# Patient Record
(demographics unavailable — no encounter records)

---

## 2025-07-21 NOTE — PROCEDURE
[Abnormal Pap] : an abnormal pap smear [Patient] : the patient [Verbal Consent] : verbal consent was obtained prior to the procedure and is detailed in the patient's record [Site Verification] : site verification performed. [No Abnormalities] : no abnormalities seen [Direct Pressure] : direct pressure [No Complications] : none [Tolerated Well] : the patient tolerated the procedure well [Post procedure instructions and information given] : post procedure instructions and information given and reviewed with patient. [Colposcopy] : colposcopy [ASCUS] : ASCUS [HPV high risk] : PCR positive for high risk HPV [Written Consent] : written consent was obtained prior to the procedure and is detailed in the patient's record [Cervical Pap Performed] : a cervical pap smear was performed [SCJ Fully Visualized] : the squamocolumnar junction was not fully visualized [Lesion] : lesion(s) seen [Posterior] : on the posterior vaginal wall [Biopsy] : a biopsy was taken of the lesion [Chanelle's] : Chanelle's solution [de-identified] : Unable to obtain ECC due to cervical stenosis; entire vagine visualized - no colpo lesions

## 2025-07-21 NOTE — DISCUSSION/SUMMARY
[Reviewed Clinical Lab Test(s)] : Results of clinical tests were reviewed. [FreeTextEntry1] : -We disc the prior findings and today's eval. We disc the colpo and await the Pap and bx from today for further dispo.  -We again disc the limits of the eval due to os stenosis.  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   -Her instructions were reviewed. -All Q/A. -She will f/u in ~2wk for a TH to disc results and further recs.  -We disc the import of [facility] (eg, [facility] and bone health).   -Effort for the visit includes the note prep, review of prior material, interview, exam, further documentation, and coordination of care.

## 2025-07-21 NOTE — PROCEDURE
[Abnormal Pap] : an abnormal pap smear [Patient] : the patient [Verbal Consent] : verbal consent was obtained prior to the procedure and is detailed in the patient's record [Site Verification] : site verification performed. [No Abnormalities] : no abnormalities seen [Direct Pressure] : direct pressure [No Complications] : none [Tolerated Well] : the patient tolerated the procedure well [Post procedure instructions and information given] : post procedure instructions and information given and reviewed with patient. [Colposcopy] : colposcopy [ASCUS] : ASCUS [HPV high risk] : PCR positive for high risk HPV [Written Consent] : written consent was obtained prior to the procedure and is detailed in the patient's record [Cervical Pap Performed] : a cervical pap smear was performed [SCJ Fully Visualized] : the squamocolumnar junction was not fully visualized [Lesion] : lesion(s) seen [Posterior] : on the posterior vaginal wall [Biopsy] : a biopsy was taken of the lesion [Chanelle's] : Chanelle's solution [de-identified] : Unable to obtain ECC due to cervical stenosis; entire vagine visualized - no colpo lesions

## 2025-07-21 NOTE — HISTORY OF PRESENT ILLNESS
[FreeTextEntry1] : Referring MD/PCP- Dr. Christal Styles GYN- Dr. Dalton (previously Dr. Pineda) GI- unknown Gyn Onc: Dr MARY ANNE Dalton, [facility]  Ms. BRITTNEE CALDERA presents for surveillance. She is s/p laser ablation of the vaginal on 9/6/22.  Sept 2024: Pap ASCUS; Pos HR-HPV. 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 See [facility] chart note. She recalls getting results but does not recall the rec for RTO in 6m.   No VB, VD, or pain. No reported GI or  concerns.   She reports [facility] is UTD.

## 2025-07-21 NOTE — REVIEW OF SYSTEMS
[Vision Problems] : vision problems [Joint Pain] : joint pain [Joint Stiffness] : joint stiffness [Muscle Pain] : muscle pain [Back Pain] : back pain [Insomnia] : insomnia [Anxiety] : anxiety [Negative] : Heme/Lymph

## 2025-07-21 NOTE — ASSESSMENT
[FreeTextEntry1] : See health maintenance assessment and plan outlined below. In addition: Full labs and urine testing done today Bone density done 2024 F/U with Dr. Cortés 2025 Podiatry f/u as needed 2025 Ortho/pain management/neurology f/u 2025 Colonoscopy done 11/16 = due 2021 as per GI note = see GI 2025 for f/u procedure as overdue = referrals given Had mammograms 2024 = RX given to do 2025 To see GYN/Oncology 2025 Urology f/u as needed 2025 Continue meds, diet, exercise as outlined EKG done and report d/w patient and scanned CXR done 2022 = she will consider repeat imaging 2025 Vaccines d/w patient ENT follow-up 2025 as needed To f/u with derm 2025 for complete skin exam To see ophtho 2025 for complete eye exam Dental f/u 2025 Psych f/u 2025 as needed = seeing therapist Can consider sleep medicine evaluation 2025 for insomnia///zolpidem prescription renewed at her request RTC for annual physical and as needed To call for any medical issues or if her status worsens/changes and to return to be seen immediately All of the above was discussed in detail with her and all of her questions were answered She verbally confirmed understanding of all of the above and agreement with the above plan  [Vaccines Reviewed] : Immunizations reviewed today. Please see immunization details in the vaccine log within the immunization flowsheet.

## 2025-07-21 NOTE — DISCUSSION/SUMMARY
[Reviewed Clinical Lab Test(s)] : Results of clinical tests were reviewed. [FreeTextEntry1] : -We disc the prior findings and today's eval. We disc the colpo and await the Pap and bx from today for further dispo.  -We again disc the limits of the eval due to os stenosis.  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   -Her instructions were reviewed. -All Q/A. -She will f/u in ~2wk for a TH to disc results and further recs.  -We disc the import of [facility] (eg, [facility] and bone health).   -Effort for the visit includes the note prep, review of prior material, interview, exam, further documentation, and coordination of care.

## 2025-07-21 NOTE — HEALTH RISK ASSESSMENT
[4 or more  times a week (4 pts)] : 4 or more  times a week (4 points) [1 or 2 (0 pts)] : 1 or 2 (0 points) [Never (0 pts)] : Never (0 points) [No] : In the past 12 months have you used drugs other than those required for medical reasons? No [No falls in past year] : Patient reported no falls in the past year [0] : 2) Feeling down, depressed, or hopeless: Not at all (0) [PHQ-2 Negative - No further assessment needed] : PHQ-2 Negative - No further assessment needed [PHQ-9 Negative - No further assessment needed] : PHQ-9 Negative - No further assessment needed [Former] : Former [NO] : No [Patient declined mammogram] : Patient declined mammogram [Patient declined colonoscopy] : Patient declined colonoscopy [HIV test declined] : HIV test declined [Hepatitis C test declined] : Hepatitis C test declined [Alone] : lives alone [# of Members in Household ___] :  household currently consist of [unfilled] member(s) [Retired] : retired [College] : College [] :  [# Of Children ___] : has [unfilled] children [Feels Safe at Home] : Feels safe at home [Fully functional (bathing, dressing, toileting, transferring, walking, feeding)] : Fully functional (bathing, dressing, toileting, transferring, walking, feeding) [Fully functional (using the telephone, shopping, preparing meals, housekeeping, doing laundry, using] : Fully functional and needs no help or supervision to perform IADLs (using the telephone, shopping, preparing meals, housekeeping, doing laundry, using transportation, managing medications and managing finances) [Smoke Detector] : smoke detector [Carbon Monoxide Detector] : carbon monoxide detector [Seat Belt] :  uses seat belt [Sunscreen] : uses sunscreen [Reviewed no changes] : Reviewed, no changes [Excellent] : ~his/her~  mood as  excellent [Little interest or pleasure doing things] : 1) Little interest or pleasure doing things [Feeling down, depressed, or hopeless] : 2) Feeling down, depressed, or hopeless [Yes] : Reviewed medication list for presence of high-risk medications. [Opioids] : opioids [Designated Healthcare Proxy] : Designated healthcare proxy [Name: ___] : Health Care Proxy's Name: [unfilled]  [Relationship: ___] : Relationship: [unfilled] [de-identified] : GYN oncology, dentist, ophthalmology, dermatology [Audit-CScore] : 4 [de-identified] : exercises [de-identified] : regular  [MDA2Tixec] : 0 [Change in mental status noted] : No change in mental status noted [Language] : denies difficulty with language [Behavior] : denies difficulty with behavior [Learning/Retaining New Information] : denies difficulty learning/retaining new information [Handling Complex Tasks] : denies difficulty handling complex tasks [Reasoning] : denies difficulty with reasoning [Spatial Ability and Orientation] : denies difficulty with spatial ability and orientation [Reports changes in hearing] : Reports no changes in hearing [Reports changes in vision] : Reports no changes in vision [Reports changes in dental health] : Reports no changes in dental health [Travel to Developing Areas] : does not  travel to developing areas [TB Exposure] : is not being exposed to tuberculosis [Caregiver Concerns] : does not have caregiver concerns [MammogramDate] : 07/24 [PapSmearDate] : 07/25 [BoneDensityDate] : 07/24 [ColonoscopyDate] : 11/16 [de-identified] : Doing  work 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

## 2025-07-21 NOTE — PHYSICAL EXAM
[Abnormal] : Examination of extremities for edema and/or varicosities: Abnormal [Absent] : CVAT: absent [Normal] : Anus and perineum: Normal sphincter tone, no masses, no prolapse. [Fully active, able to carry on all pre-disease performance without restriction] : Status 0 - Fully active, able to carry on all pre-disease performance without restriction [MA] : MA [FreeTextEntry2] : Angely [de-identified] : Trace edema [de-identified] : atrophy, no lesions seen [de-identified] : os stenotic [de-identified] : The uterus was nonpalpable [de-identified] : The adnexa were nonpalpable [de-identified] : teresita [de-identified] : Rectal deferred

## 2025-07-21 NOTE — PHYSICAL EXAM
[Abnormal] : Examination of extremities for edema and/or varicosities: Abnormal [Absent] : CVAT: absent [Normal] : Anus and perineum: Normal sphincter tone, no masses, no prolapse. [Fully active, able to carry on all pre-disease performance without restriction] : Status 0 - Fully active, able to carry on all pre-disease performance without restriction [MA] : MA [FreeTextEntry2] : Angely [de-identified] : Trace edema [de-identified] : atrophy, no lesions seen [de-identified] : os stenotic [de-identified] : The uterus was nonpalpable [de-identified] : The adnexa were nonpalpable [de-identified] : teresita [de-identified] : Rectal deferred

## 2025-07-21 NOTE — HISTORY OF PRESENT ILLNESS
[FreeTextEntry1] : Referring MD/PCP- Dr. Christal Styles GYN- Dr. aDlton (previously Dr. Pineda) GI- unknown Gyn Onc: Dr MARY ANNE Dalton, [facility]  Ms. BRITTNEE CALDERA presents for surveillance. She is s/p laser ablation of the vaginal on 9/6/22.  Sept 2024: Pap ASCUS; Pos HR-HPV. 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 See [facility] chart note. She recalls getting results but does not recall the rec for RTO in 6m.   No VB, VD, or pain. No reported GI or  concerns.   She reports [facility] is UTD.

## 2025-07-21 NOTE — PHYSICAL EXAM
[No Acute Distress] : no acute distress [Well Nourished] : well nourished [Well Developed] : well developed [Well-Appearing] : well-appearing [Normal Voice/Communication] : normal voice/communication [Normal Sclera/Conjunctiva] : normal sclera/conjunctiva [PERRL] : pupils equal round and reactive to light [EOMI] : extraocular movements intact [Normal Outer Ear/Nose] : the outer ears and nose were normal in appearance [Normal Oropharynx] : the oropharynx was normal [Normal TMs] : both tympanic membranes were normal [No JVD] : no jugular venous distention [Supple] : supple [No Lymphadenopathy] : no lymphadenopathy [No Respiratory Distress] : no respiratory distress  [Clear to Auscultation] : lungs were clear to auscultation bilaterally [No Accessory Muscle Use] : no accessory muscle use [Normal Rate] : normal rate  [Regular Rhythm] : with a regular rhythm [Normal S1, S2] : normal S1 and S2 [No Carotid Bruits] : no carotid bruits [Pedal Pulses Present] : the pedal pulses are present [No Edema] : there was no peripheral edema [No Extremity Clubbing/Cyanosis] : no extremity clubbing/cyanosis [Normal Appearance] : normal in appearance [No Nipple Discharge] : no nipple discharge [No Axillary Lymphadenopathy] : no axillary lymphadenopathy [Soft] : abdomen soft [Non Tender] : non-tender [Non-distended] : non-distended [No Masses] : no abdominal mass palpated [No HSM] : no HSM [Normal Bowel Sounds] : normal bowel sounds [Declined Rectal Exam] : declined rectal exam [Normal Supraclavicular Nodes] : no supraclavicular lymphadenopathy [Normal Axillary Nodes] : no axillary lymphadenopathy [Normal Posterior Cervical Nodes] : no posterior cervical lymphadenopathy [Normal Anterior Cervical Nodes] : no anterior cervical lymphadenopathy [No CVA Tenderness] : no CVA  tenderness [No Spinal Tenderness] : no spinal tenderness [Grossly Normal Strength/Tone] : grossly normal strength/tone [No Rash] : no rash [Normal Gait] : normal gait [Coordination Grossly Intact] : coordination grossly intact [No Focal Deficits] : no focal deficits [Speech Grossly Normal] : speech grossly normal [Normal Affect] : the affect was normal [Alert and Oriented x3] : oriented to person, place, and time [Memory Grossly Normal] : memory grossly normal [Normal Mood] : the mood was normal [Normal Insight/Judgement] : insight and judgment were intact [de-identified] : wearing glasses [de-identified] : no ST [de-identified] : no stridor or TM [de-identified] : R=16 [de-identified] : normal radial pulses; no cords [de-identified] : As per gynecology